# Patient Record
Sex: FEMALE | Race: WHITE | NOT HISPANIC OR LATINO | URBAN - METROPOLITAN AREA
[De-identification: names, ages, dates, MRNs, and addresses within clinical notes are randomized per-mention and may not be internally consistent; named-entity substitution may affect disease eponyms.]

---

## 2023-05-05 ENCOUNTER — EMERGENCY (EMERGENCY)
Facility: HOSPITAL | Age: 43
LOS: 1 days | Discharge: HOME | End: 2023-05-05
Attending: EMERGENCY MEDICINE | Admitting: EMERGENCY MEDICINE
Payer: COMMERCIAL

## 2023-05-05 VITALS
DIASTOLIC BLOOD PRESSURE: 87 MMHG | HEART RATE: 97 BPM | TEMPERATURE: 99 F | SYSTOLIC BLOOD PRESSURE: 152 MMHG | RESPIRATION RATE: 18 BRPM | OXYGEN SATURATION: 99 % | WEIGHT: 147.49 LBS

## 2023-05-05 VITALS
OXYGEN SATURATION: 99 % | TEMPERATURE: 98 F | RESPIRATION RATE: 16 BRPM | SYSTOLIC BLOOD PRESSURE: 125 MMHG | DIASTOLIC BLOOD PRESSURE: 78 MMHG | HEART RATE: 67 BPM

## 2023-05-05 DIAGNOSIS — R10.31 RIGHT LOWER QUADRANT PAIN: ICD-10-CM

## 2023-05-05 DIAGNOSIS — N84.0 POLYP OF CORPUS UTERI: ICD-10-CM

## 2023-05-05 DIAGNOSIS — R11.0 NAUSEA: ICD-10-CM

## 2023-05-05 DIAGNOSIS — R63.0 ANOREXIA: ICD-10-CM

## 2023-05-05 DIAGNOSIS — N93.9 ABNORMAL UTERINE AND VAGINAL BLEEDING, UNSPECIFIED: ICD-10-CM

## 2023-05-05 LAB
ALBUMIN SERPL ELPH-MCNC: 4.5 G/DL — SIGNIFICANT CHANGE UP (ref 3.3–5)
ALP SERPL-CCNC: 61 U/L — SIGNIFICANT CHANGE UP (ref 40–120)
ALT FLD-CCNC: 14 U/L — SIGNIFICANT CHANGE UP (ref 10–45)
ANION GAP SERPL CALC-SCNC: 11 MMOL/L — SIGNIFICANT CHANGE UP (ref 5–17)
APPEARANCE UR: ABNORMAL
AST SERPL-CCNC: 15 U/L — SIGNIFICANT CHANGE UP (ref 10–40)
BACTERIA # UR AUTO: PRESENT /HPF
BASOPHILS # BLD AUTO: 0.03 K/UL — SIGNIFICANT CHANGE UP (ref 0–0.2)
BASOPHILS NFR BLD AUTO: 0.3 % — SIGNIFICANT CHANGE UP (ref 0–2)
BILIRUB SERPL-MCNC: 0.4 MG/DL — SIGNIFICANT CHANGE UP (ref 0.2–1.2)
BILIRUB UR-MCNC: NEGATIVE — SIGNIFICANT CHANGE UP
BUN SERPL-MCNC: 13 MG/DL — SIGNIFICANT CHANGE UP (ref 7–23)
CALCIUM SERPL-MCNC: 9.6 MG/DL — SIGNIFICANT CHANGE UP (ref 8.4–10.5)
CHLORIDE SERPL-SCNC: 101 MMOL/L — SIGNIFICANT CHANGE UP (ref 96–108)
CO2 SERPL-SCNC: 23 MMOL/L — SIGNIFICANT CHANGE UP (ref 22–31)
COLOR SPEC: ABNORMAL
CREAT SERPL-MCNC: 0.68 MG/DL — SIGNIFICANT CHANGE UP (ref 0.5–1.3)
DIFF PNL FLD: ABNORMAL
EGFR: 111 ML/MIN/1.73M2 — SIGNIFICANT CHANGE UP
EOSINOPHIL # BLD AUTO: 0.01 K/UL — SIGNIFICANT CHANGE UP (ref 0–0.5)
EOSINOPHIL NFR BLD AUTO: 0.1 % — SIGNIFICANT CHANGE UP (ref 0–6)
EPI CELLS # UR: SIGNIFICANT CHANGE UP /HPF (ref 0–5)
GLUCOSE SERPL-MCNC: 105 MG/DL — HIGH (ref 70–99)
GLUCOSE UR QL: NEGATIVE — SIGNIFICANT CHANGE UP
HCT VFR BLD CALC: 41.5 % — SIGNIFICANT CHANGE UP (ref 34.5–45)
HGB BLD-MCNC: 13.7 G/DL — SIGNIFICANT CHANGE UP (ref 11.5–15.5)
IMM GRANULOCYTES NFR BLD AUTO: 0.3 % — SIGNIFICANT CHANGE UP (ref 0–0.9)
KETONES UR-MCNC: 15 MG/DL
LEUKOCYTE ESTERASE UR-ACNC: ABNORMAL
LIDOCAIN IGE QN: 19 U/L — SIGNIFICANT CHANGE UP (ref 7–60)
LYMPHOCYTES # BLD AUTO: 1.51 K/UL — SIGNIFICANT CHANGE UP (ref 1–3.3)
LYMPHOCYTES # BLD AUTO: 15.4 % — SIGNIFICANT CHANGE UP (ref 13–44)
MCHC RBC-ENTMCNC: 29.3 PG — SIGNIFICANT CHANGE UP (ref 27–34)
MCHC RBC-ENTMCNC: 33 GM/DL — SIGNIFICANT CHANGE UP (ref 32–36)
MCV RBC AUTO: 88.9 FL — SIGNIFICANT CHANGE UP (ref 80–100)
MONOCYTES # BLD AUTO: 0.35 K/UL — SIGNIFICANT CHANGE UP (ref 0–0.9)
MONOCYTES NFR BLD AUTO: 3.6 % — SIGNIFICANT CHANGE UP (ref 2–14)
NEUTROPHILS # BLD AUTO: 7.85 K/UL — HIGH (ref 1.8–7.4)
NEUTROPHILS NFR BLD AUTO: 80.3 % — HIGH (ref 43–77)
NITRITE UR-MCNC: POSITIVE
NRBC # BLD: 0 /100 WBCS — SIGNIFICANT CHANGE UP (ref 0–0)
PH UR: 5 — SIGNIFICANT CHANGE UP (ref 5–8)
PLATELET # BLD AUTO: 322 K/UL — SIGNIFICANT CHANGE UP (ref 150–400)
POTASSIUM SERPL-MCNC: 3.6 MMOL/L — SIGNIFICANT CHANGE UP (ref 3.5–5.3)
POTASSIUM SERPL-SCNC: 3.6 MMOL/L — SIGNIFICANT CHANGE UP (ref 3.5–5.3)
PROT SERPL-MCNC: 7.8 G/DL — SIGNIFICANT CHANGE UP (ref 6–8.3)
PROT UR-MCNC: 100 MG/DL
RBC # BLD: 4.67 M/UL — SIGNIFICANT CHANGE UP (ref 3.8–5.2)
RBC # FLD: 13.1 % — SIGNIFICANT CHANGE UP (ref 10.3–14.5)
RBC CASTS # UR COMP ASSIST: ABNORMAL /HPF
SODIUM SERPL-SCNC: 135 MMOL/L — SIGNIFICANT CHANGE UP (ref 135–145)
SP GR SPEC: >=1.03 — SIGNIFICANT CHANGE UP (ref 1–1.03)
UROBILINOGEN FLD QL: 1 E.U./DL — SIGNIFICANT CHANGE UP
WBC # BLD: 9.78 K/UL — SIGNIFICANT CHANGE UP (ref 3.8–10.5)
WBC # FLD AUTO: 9.78 K/UL — SIGNIFICANT CHANGE UP (ref 3.8–10.5)
WBC UR QL: ABNORMAL /HPF

## 2023-05-05 PROCEDURE — 85025 COMPLETE CBC W/AUTO DIFF WBC: CPT

## 2023-05-05 PROCEDURE — 36415 COLL VENOUS BLD VENIPUNCTURE: CPT

## 2023-05-05 PROCEDURE — 76830 TRANSVAGINAL US NON-OB: CPT | Mod: 26

## 2023-05-05 PROCEDURE — 82962 GLUCOSE BLOOD TEST: CPT

## 2023-05-05 PROCEDURE — 83690 ASSAY OF LIPASE: CPT

## 2023-05-05 PROCEDURE — 74177 CT ABD & PELVIS W/CONTRAST: CPT | Mod: 26,MA

## 2023-05-05 PROCEDURE — 76856 US EXAM PELVIC COMPLETE: CPT

## 2023-05-05 PROCEDURE — 74177 CT ABD & PELVIS W/CONTRAST: CPT | Mod: MA

## 2023-05-05 PROCEDURE — 99284 EMERGENCY DEPT VISIT MOD MDM: CPT

## 2023-05-05 PROCEDURE — 96374 THER/PROPH/DIAG INJ IV PUSH: CPT | Mod: XU

## 2023-05-05 PROCEDURE — 99284 EMERGENCY DEPT VISIT MOD MDM: CPT | Mod: 25

## 2023-05-05 PROCEDURE — 80053 COMPREHEN METABOLIC PANEL: CPT

## 2023-05-05 PROCEDURE — 76830 TRANSVAGINAL US NON-OB: CPT

## 2023-05-05 PROCEDURE — 76856 US EXAM PELVIC COMPLETE: CPT | Mod: 26

## 2023-05-05 PROCEDURE — 81001 URINALYSIS AUTO W/SCOPE: CPT

## 2023-05-05 PROCEDURE — 87086 URINE CULTURE/COLONY COUNT: CPT

## 2023-05-05 RX ORDER — ONDANSETRON 8 MG/1
4 TABLET, FILM COATED ORAL ONCE
Refills: 0 | Status: DISCONTINUED | OUTPATIENT
Start: 2023-05-05 | End: 2023-05-09

## 2023-05-05 RX ORDER — KETOROLAC TROMETHAMINE 30 MG/ML
15 SYRINGE (ML) INJECTION ONCE
Refills: 0 | Status: DISCONTINUED | OUTPATIENT
Start: 2023-05-05 | End: 2023-05-05

## 2023-05-05 RX ORDER — IOHEXOL 300 MG/ML
30 INJECTION, SOLUTION INTRAVENOUS ONCE
Refills: 0 | Status: COMPLETED | OUTPATIENT
Start: 2023-05-05 | End: 2023-05-05

## 2023-05-05 RX ADMIN — IOHEXOL 30 MILLILITER(S): 300 INJECTION, SOLUTION INTRAVENOUS at 17:07

## 2023-05-05 RX ADMIN — Medication 15 MILLIGRAM(S): at 18:07

## 2023-05-05 RX ADMIN — Medication 15 MILLIGRAM(S): at 17:07

## 2023-05-05 NOTE — ED PROVIDER NOTE - PROGRESS NOTE DETAILS
Results d/w pt. Advised on close f/u gyn. Strict return precautions. Pt and mother demonstrate understanding of plan.

## 2023-05-05 NOTE — ED PROVIDER NOTE - CLINICAL SUMMARY MEDICAL DECISION MAKING FREE TEXT BOX
Pt p/w RLQ / R pelvic pain and abnormal vaginal bleeding. Not sexually active. UCG neg. DDx includes but not limited to abnormal menses NOS, fibroids, endometriosis, ovarian cyst, less likely appendicitis, other pathology. Check labs, TVUS. Re-eval. Consider CT a/p if US unrevealing. Dispo pending w/u and clinical status

## 2023-05-05 NOTE — ED PROVIDER NOTE - PHYSICAL EXAMINATION
Constitutional: Well appearing, awake, alert, oriented to person, place, time/situation and in no apparent distress.  ENMT: Airway patent. Normal MM  Eyes: Clear bilaterally. no conjunctival pallor  Cardiac: Normal rate, regular rhythm.  Heart sounds S1, S2.  No murmurs, rubs or gallops.  Respiratory: Breaths sounds equal and clear b/l. No increased WOB, tachypnea, hypoxia, or accessory mm use. Pt speaks in full sentences.   Gastrointestinal: Abd soft, ND, NABS. + mild ttp in the RLQ / R pelvic region. No guarding, rebound, or rigidity. No pulsatile abdominal masses. No organomegaly appreciated. No CVAT   : normal external genitalia. + 2-3mL blood pooling in the vagina. no clots. No active bleeding from the cervix nor with coughing. + mild R adnexal ttp. no masses appreciated. cervix closed. no L adnexal ttp. no CMT  Musculoskeletal: Range of motion is not limited  Neuro: Alert and oriented x 3, face symmetric and speech fluent. Strength 5/5 x 4 ext and symmetric, nml gross motor movement, nml gait. No focal deficits noted.  Skin: Skin normal color for race, warm, dry and intact. No evidence of rash.  Psych: Alert and oriented to person, place, time/situation. normal mood and affect. no apparent risk to self or others.

## 2023-05-05 NOTE — ED PROVIDER NOTE - NSFOLLOWUPINSTRUCTIONS_ED_ALL_ED_FT
Your blood work was within normal limits. Your urinalysis is equivocal for infection. It is likely contaminated from the vaginal bleeding. You reported no symptoms. A urine culture was sent today and will result in 2-3 days. If significant bacteria grows in the culture that requires treatment, you will be contacted.     You had a pelvic ultrasound which showed a possible endometrial polyp, which may cause increased bleeding. It is important you see a gynecologist for further evaluation and treatment. There were no other significant abnormalities on your ultrasound.     [facility]  [address] [place], [place]  [phone]     You had a CT of the abdomen and pelvis which did not show any acute abnormalities. You have a normal appendix.    Return to the ED for heavy vaginal bleeding (more than 1 pad per hour), severe pain, fever, persistent vomiting, or other concerning symptoms.     Abnormal Uterine Bleeding  A female body showing the reproductive organs, with a close-up view of the uterus and vagina.  Abnormal uterine bleeding is unusual bleeding from the uterus. It includes bleeding after sex, or bleeding or spotting between menstrual periods. It may also include bleeding that is heavier than normal, menstrual periods that last longer than usual, or bleeding that occurs after menopause.    Abnormal uterine bleeding can affect teenagers, women in their reproductive years, pregnant women, and women who have reached menopause. Common causes of abnormal uterine bleeding include:  Pregnancy.  Abnormal growths within the lining of the uterus (polyps).  Benign tumors or growths in the uterus (fibroids). These are not cancer.  Infection.  Cancer.  Too much or too little of some hormones in the body (hormonal imbalances).  Any type of abnormal bleeding should be checked by a health care provider. Many cases are minor and simple to treat, but others may be more serious. Treatment will depend on the cause of the bleeding and how severe it is.    Follow these instructions at home:  Medicines    Take over-the-counter and prescription medicines only as told by your health care provider.  Ask your health care provider about:  Taking medicines such as aspirin and ibuprofen. These medicines can thin your blood. Do not take these medicines unless your health care provider tells you to take them.  Taking over-the-counter medicines, vitamins, herbs, and supplements.  If you were prescribed iron pills, take them as told by your health care provider. Iron pills help to replace iron that your body loses because of this condition.  Managing constipation    In cases of severe bleeding, you may be asked to increase your iron intake to treat anemia. Doing this may cause constipation. To prevent or treat constipation, you may need to:  Drink enough fluid to keep your urine pale yellow.  Take over-the-counter or prescription medicines.  Eat foods that are high in fiber, such as beans, whole grains, and fresh fruits and vegetables.  Limit foods that are high in fat and processed sugars, such as fried or sweet foods.  Activity    Alter your activity to decrease bleeding if you need to change your sanitary pad more than one time every 2 hours:  Lie in bed with your feet raised (elevated).  Place a cold pack on your lower abdomen.  Rest as much as possible until the bleeding stops or slows down.  General instructions    Do not use tampons, douche, or have sex until your health care provider says these things are okay.  Change your sanitary pads often.  Get regular exams. These include pelvic exams and cervical cancer screenings.  It is up to you to get the results of any tests that are done. Ask your health care provider, or the department that is doing the tests, when your results will be ready.  Monitor your condition for any changes. For 2 months, write down:  When your menstrual period starts.  When your menstrual period ends.  When any abnormal vaginal bleeding occurs.  What problems you notice.  Keep all follow-up visits. This is important.  Contact a health care provider if:  You have bleeding that lasts for more than one week.  You feel dizzy at times.  You feel nauseous or you vomit.  You feel light-headed or weak.  You notice any other changes that show that your condition is getting worse.  Get help right away if:  You faint.  You have bleeding that soaks through a sanitary pad every hour.  You have pain in the abdomen.  You have a fever or chills.  You become sweaty or weak.  You pass large blood clots from your vagina.  These symptoms may represent a serious problem that is an emergency. Do not wait to see if the symptoms will go away. Get medical help right away. Call your local emergency services (911 in the [place] Do not drive yourself to the hospital.    Summary  Abnormal uterine bleeding is unusual bleeding from the uterus.  Any type of abnormal bleeding should be checked by a health care provider. Many cases are minor and simple to treat, but others may be more serious.  Treatment will depend on the cause of the bleeding and how severe it is.  Get help right away if you faint, you have bleeding that soaks through a sanitary pad every hour, or you pass large blood clots from your vagina.  This information is not intended to replace advice given to you by your health care provider. Make sure you discuss any questions you have with your health care provider.    Endometrial Polyps    WHAT YOU NEED TO KNOW:    What is an endometrial polyp? A polyp is a mass of tissue that grows in the lining of your uterus (called the endometrium). A polyp is connected to the lining by a stalk. A polyp may be cancer, but most polyps are benign (not cancer). The size can range from very small to about the size of a golf ball. A large polyp may push down through the cervix and into your vagina. You may also have more than one polyp.  Female Reproductive System    What increases my risk for an endometrial polyp?    High estrogen (female hormone) levels    Obesity    Age 50 years or older    Use of certain breast cancer medicines  What are the signs and symptoms of an endometrial polyp? You may have no signs or symptoms. The polyp may be found during tests or treatment for another condition. You may have any of the following if you do have signs or symptoms:    Irregular bleeding during childbearing years, or trouble getting pregnant    Heavy bleeding during your period    Spotting, especially after sex    Vaginal bleeding after menopause  How is an endometrial polyp diagnosed? Your healthcare provider will examine you and ask about your symptoms. Certain tests may be used to check for a polyp. During a sonohysterogram (water ultrasound), water is pushed into your uterus to keep it expanded. This helps the ultrasound see polyps more easily. A hysteroscopy is a test that uses a scope to see inside your uterus. Tissue samples may also be taken and sent to a lab to be tested for cancer.    How is an endometrial polyp treated? An endometrial polyp may go away on its own without treatment. If it does not go away, causes discomfort, or gets larger, it may need to be removed. A procedure called a dilation and curettage (D&C) is usually used to remove endometrial polyps. During a D&C, your healthcare provider will remove tissue from your uterus. This will remove the polyp along with the other tissue. You may need more than 1 D&C if the polyp is not removed the first time. A hysterectomy may be used to remove your uterus if you have several polyps that are cancer.    What can I do to manage an endometrial polyp?    Reach or maintain a healthy weight. Extra weight increases your estrogen level. This can increase your risk for more polyps. Talk to your healthcare provider about a healthy weight for you. He or she can help you create a weight loss plan if you need to lose weight.    Eat a variety of healthy foods. Healthy foods can help you manage your weight and lower your risk for cancer. Healthy foods include fruits, vegetables, low-fat dairy products, cooked beans, lean meats, and fish. Your healthcare provider or a dietitian can help you create a healthy meal plan.  Healthy Foods      Talk to your healthcare provider about pregnancy. You may have problems getting pregnant if the polyp affects other parts of your reproductive system. Your healthcare provider can talk to you about treatment that may help.  When should I seek immediate care?    You have bleeding from your vagina that continues or is bright red.    You feel dizzy or lightheaded from heavy blood loss.  When should I contact my healthcare provider?    You have new or worsening symptoms.    You have symptoms again after successful treatment for a polyp.    You have questions or concerns about your condition or care.  CARE AGREEMENT:    You have the right to help plan your care. Learn about your health condition and how it may be treated. Discuss treatment options with your healthcare providers to decide what care you want to receive. You always have the right to refuse treatment.    Abdominal Pain    Many things can cause abdominal pain. Many times, abdominal pain is not caused by a disease and will improve without treatment. Your health care provider will do a physical exam to determine if there is a dangerous cause of your pain; blood tests and imaging may help determine the cause of your pain. However, in many cases, no cause may be found and you may need further testing as an outpatient. Monitor your abdominal pain for any changes.     SEEK IMMEDIATE MEDICAL CARE IF YOU HAVE ANY OF THE FOLLOWING SYMPTOMS: worsening abdominal pain, uncontrollable vomiting, profuse diarrhea, inability to have bowel movements or pass gas, black or bloody stools, fever accompanying chest pain or back pain, or fainting. These symptoms may represent a serious problem that is an emergency. Do not wait to see if the symptoms will go away. Get medical help right away. Call 911 and do not drive yourself to the hospital.

## 2023-05-05 NOTE — ED PROVIDER NOTE - CARE PLAN
1 Principal Discharge DX:	Abnormal vaginal bleeding  Secondary Diagnosis:	Endometrial polyp  Secondary Diagnosis:	Abdominal pain

## 2023-05-05 NOTE — ED ADULT NURSE NOTE - OBJECTIVE STATEMENT
patient arrived to ED c/o right sided lower abdominal pain, which started a few days ago, and vaginal bleeding since yesterday. patient endorses going through 5 pads. denies fever, cough, shortness of breath, nausea or vomiting.

## 2023-05-05 NOTE — ED ADULT TRIAGE NOTE - CHIEF COMPLAINT QUOTE
"  I have pain to the right lower abdomen going to my back for 5 days and vaginal bleeding since yesterday"

## 2023-05-05 NOTE — ED PROVIDER NOTE - OBJECTIVE STATEMENT
Pt w/ no sig PMHx, no PSHx p/w abdominal pain and vaginal bleeding. The pain is in the RLQ / R pelvic region for the past 5 days. The pain is constant but waxes and wanes in severity, w/o clear alleviating or mitigating factors. She has not taken anything for pain. + nausea. No vomiting. + dec appetite. No diarrhea or constipation. She states she last had a normal period in the end of 2023. Prior to that she had regular periods. Since then she has had intermittent brownish spotting. Onset yesterday, bleeding increased w/ red blood, more than a period. She has used 5 pads today. No known hx ovarian cyst nor fibroids. She has not seen a gyn or a PCP in years. She is not sexually active and has not been in some time. . No hx STDs. no other abnormal discharge. No F/U/D nor hematuria. No f/c. Never had EGD nor cscope

## 2023-05-05 NOTE — ED PROVIDER NOTE - PATIENT PORTAL LINK FT
You can access the FollowMyHealth Patient Portal offered by [facility] by registering at the following website: [email or web address]. By joining [employer]’s FollowMyHealth portal, you will also be able to view your health information using other applications (apps) compatible with our system.

## 2023-05-06 LAB
CULTURE RESULTS: SIGNIFICANT CHANGE UP
SPECIMEN SOURCE: SIGNIFICANT CHANGE UP

## 2024-07-09 ENCOUNTER — EMERGENCY (EMERGENCY)
Facility: HOSPITAL | Age: 44
LOS: 1 days | Discharge: HOME | End: 2024-07-09
Attending: EMERGENCY MEDICINE | Admitting: EMERGENCY MEDICINE
Payer: COMMERCIAL

## 2024-07-09 VITALS
WEIGHT: 134.92 LBS | HEIGHT: 65 IN | HEART RATE: 90 BPM | SYSTOLIC BLOOD PRESSURE: 146 MMHG | OXYGEN SATURATION: 100 % | DIASTOLIC BLOOD PRESSURE: 97 MMHG | TEMPERATURE: 99 F | RESPIRATION RATE: 16 BRPM

## 2024-07-09 DIAGNOSIS — M54.9 DORSALGIA, UNSPECIFIED: ICD-10-CM

## 2024-07-09 DIAGNOSIS — L50.9 URTICARIA, UNSPECIFIED: ICD-10-CM

## 2024-07-09 DIAGNOSIS — R74.01 ELEVATION OF LEVELS OF LIVER TRANSAMINASE LEVELS: ICD-10-CM

## 2024-07-09 DIAGNOSIS — R19.7 DIARRHEA, UNSPECIFIED: ICD-10-CM

## 2024-07-09 DIAGNOSIS — R10.11 RIGHT UPPER QUADRANT PAIN: ICD-10-CM

## 2024-07-09 DIAGNOSIS — Z20.822 CONTACT WITH AND (SUSPECTED) EXPOSURE TO COVID-19: ICD-10-CM

## 2024-07-09 DIAGNOSIS — R68.2 DRY MOUTH, UNSPECIFIED: ICD-10-CM

## 2024-07-09 LAB
ADD ON TEST-SPECIMEN IN LAB: SIGNIFICANT CHANGE UP
ALBUMIN SERPL ELPH-MCNC: 4.1 G/DL — SIGNIFICANT CHANGE UP (ref 3.3–5)
ALP SERPL-CCNC: 96 U/L — SIGNIFICANT CHANGE UP (ref 40–120)
ALT FLD-CCNC: 183 U/L — HIGH (ref 10–45)
ANION GAP SERPL CALC-SCNC: 11 MMOL/L — SIGNIFICANT CHANGE UP (ref 5–17)
AST SERPL-CCNC: 85 U/L — HIGH (ref 10–40)
BASOPHILS # BLD AUTO: 0 K/UL — SIGNIFICANT CHANGE UP (ref 0–0.2)
BASOPHILS NFR BLD AUTO: 0 % — SIGNIFICANT CHANGE UP (ref 0–2)
BILIRUB SERPL-MCNC: 0.3 MG/DL — SIGNIFICANT CHANGE UP (ref 0.2–1.2)
BUN SERPL-MCNC: 8 MG/DL — SIGNIFICANT CHANGE UP (ref 7–23)
CALCIUM SERPL-MCNC: 9.4 MG/DL — SIGNIFICANT CHANGE UP (ref 8.4–10.5)
CHLORIDE SERPL-SCNC: 104 MMOL/L — SIGNIFICANT CHANGE UP (ref 96–108)
CO2 SERPL-SCNC: 24 MMOL/L — SIGNIFICANT CHANGE UP (ref 22–31)
CREAT SERPL-MCNC: 0.66 MG/DL — SIGNIFICANT CHANGE UP (ref 0.5–1.3)
EGFR: 111 ML/MIN/1.73M2 — SIGNIFICANT CHANGE UP
EOSINOPHIL # BLD AUTO: 0.12 K/UL — SIGNIFICANT CHANGE UP (ref 0–0.5)
EOSINOPHIL NFR BLD AUTO: 1.7 % — SIGNIFICANT CHANGE UP (ref 0–6)
FLUAV AG NPH QL: SIGNIFICANT CHANGE UP
FLUBV AG NPH QL: SIGNIFICANT CHANGE UP
GIANT PLATELETS BLD QL SMEAR: PRESENT — SIGNIFICANT CHANGE UP
GLUCOSE SERPL-MCNC: 111 MG/DL — HIGH (ref 70–99)
HCT VFR BLD CALC: 42 % — SIGNIFICANT CHANGE UP (ref 34.5–45)
HGB BLD-MCNC: 13.7 G/DL — SIGNIFICANT CHANGE UP (ref 11.5–15.5)
LIDOCAIN IGE QN: 17 U/L — SIGNIFICANT CHANGE UP (ref 7–60)
LYMPHOCYTES # BLD AUTO: 1.42 K/UL — SIGNIFICANT CHANGE UP (ref 1–3.3)
LYMPHOCYTES # BLD AUTO: 20.2 % — SIGNIFICANT CHANGE UP (ref 13–44)
MAGNESIUM SERPL-MCNC: 1.8 MG/DL — SIGNIFICANT CHANGE UP (ref 1.6–2.6)
MANUAL SMEAR VERIFICATION: SIGNIFICANT CHANGE UP
MCHC RBC-ENTMCNC: 29 PG — SIGNIFICANT CHANGE UP (ref 27–34)
MCHC RBC-ENTMCNC: 32.6 GM/DL — SIGNIFICANT CHANGE UP (ref 32–36)
MCV RBC AUTO: 89 FL — SIGNIFICANT CHANGE UP (ref 80–100)
MONOCYTES # BLD AUTO: 0.31 K/UL — SIGNIFICANT CHANGE UP (ref 0–0.9)
MONOCYTES NFR BLD AUTO: 4.4 % — SIGNIFICANT CHANGE UP (ref 2–14)
NEUTROPHILS # BLD AUTO: 5.2 K/UL — SIGNIFICANT CHANGE UP (ref 1.8–7.4)
NEUTROPHILS NFR BLD AUTO: 73.7 % — SIGNIFICANT CHANGE UP (ref 43–77)
NRBC # BLD: 1 /100 WBCS — HIGH (ref 0–0)
NRBC # BLD: SIGNIFICANT CHANGE UP /100 WBCS (ref 0–0)
PLAT MORPH BLD: ABNORMAL
PLATELET # BLD AUTO: 255 K/UL — SIGNIFICANT CHANGE UP (ref 150–400)
POLYCHROMASIA BLD QL SMEAR: SLIGHT — SIGNIFICANT CHANGE UP
POTASSIUM SERPL-MCNC: 4 MMOL/L — SIGNIFICANT CHANGE UP (ref 3.5–5.3)
POTASSIUM SERPL-SCNC: 4 MMOL/L — SIGNIFICANT CHANGE UP (ref 3.5–5.3)
PROT SERPL-MCNC: 7.7 G/DL — SIGNIFICANT CHANGE UP (ref 6–8.3)
RBC # BLD: 4.72 M/UL — SIGNIFICANT CHANGE UP (ref 3.8–5.2)
RBC # FLD: 12.6 % — SIGNIFICANT CHANGE UP (ref 10.3–14.5)
RBC BLD AUTO: NORMAL — SIGNIFICANT CHANGE UP
RSV RNA NPH QL NAA+NON-PROBE: SIGNIFICANT CHANGE UP
SARS-COV-2 RNA SPEC QL NAA+PROBE: SIGNIFICANT CHANGE UP
SMUDGE CELLS # BLD: PRESENT — SIGNIFICANT CHANGE UP
SODIUM SERPL-SCNC: 139 MMOL/L — SIGNIFICANT CHANGE UP (ref 135–145)
WBC # BLD: 7.05 K/UL — SIGNIFICANT CHANGE UP (ref 3.8–10.5)
WBC # FLD AUTO: 7.05 K/UL — SIGNIFICANT CHANGE UP (ref 3.8–10.5)

## 2024-07-09 PROCEDURE — 76705 ECHO EXAM OF ABDOMEN: CPT

## 2024-07-09 PROCEDURE — 85025 COMPLETE CBC W/AUTO DIFF WBC: CPT

## 2024-07-09 PROCEDURE — 84484 ASSAY OF TROPONIN QUANT: CPT

## 2024-07-09 PROCEDURE — 71046 X-RAY EXAM CHEST 2 VIEWS: CPT

## 2024-07-09 PROCEDURE — 93010 ELECTROCARDIOGRAM REPORT: CPT

## 2024-07-09 PROCEDURE — 96374 THER/PROPH/DIAG INJ IV PUSH: CPT

## 2024-07-09 PROCEDURE — 87637 SARSCOV2&INF A&B&RSV AMP PRB: CPT

## 2024-07-09 PROCEDURE — 71046 X-RAY EXAM CHEST 2 VIEWS: CPT | Mod: 26

## 2024-07-09 PROCEDURE — 36415 COLL VENOUS BLD VENIPUNCTURE: CPT

## 2024-07-09 PROCEDURE — 76705 ECHO EXAM OF ABDOMEN: CPT | Mod: 26

## 2024-07-09 PROCEDURE — 93005 ELECTROCARDIOGRAM TRACING: CPT

## 2024-07-09 PROCEDURE — 83690 ASSAY OF LIPASE: CPT

## 2024-07-09 PROCEDURE — 96375 TX/PRO/DX INJ NEW DRUG ADDON: CPT

## 2024-07-09 PROCEDURE — 83735 ASSAY OF MAGNESIUM: CPT

## 2024-07-09 PROCEDURE — 80053 COMPREHEN METABOLIC PANEL: CPT

## 2024-07-09 PROCEDURE — 99285 EMERGENCY DEPT VISIT HI MDM: CPT | Mod: 25

## 2024-07-09 PROCEDURE — 99285 EMERGENCY DEPT VISIT HI MDM: CPT

## 2024-07-09 RX ORDER — DIPHENHYDRAMINE HCL 12.5MG/5ML
50 ELIXIR ORAL ONCE
Refills: 0 | Status: COMPLETED | OUTPATIENT
Start: 2024-07-09 | End: 2024-07-09

## 2024-07-09 RX ORDER — SODIUM CHLORIDE 0.9 % (FLUSH) 0.9 %
1000 SYRINGE (ML) INJECTION ONCE
Refills: 0 | Status: COMPLETED | OUTPATIENT
Start: 2024-07-09 | End: 2024-07-09

## 2024-07-09 RX ORDER — PREDNISONE 10 MG/1
2 TABLET ORAL
Qty: 8 | Refills: 0
Start: 2024-07-09 | End: 2024-07-12

## 2024-07-09 RX ORDER — FAMOTIDINE 40 MG
20 TABLET ORAL ONCE
Refills: 0 | Status: COMPLETED | OUTPATIENT
Start: 2024-07-09 | End: 2024-07-09

## 2024-07-09 RX ORDER — DIPHENHYDRAMINE HCL 12.5MG/5ML
1 ELIXIR ORAL
Qty: 30 | Refills: 0
Start: 2024-07-09

## 2024-07-09 RX ORDER — METHYLPREDNISOLONE ACETATE 20 MG/ML
40 VIAL (ML) INJECTION ONCE
Refills: 0 | Status: COMPLETED | OUTPATIENT
Start: 2024-07-09 | End: 2024-07-09

## 2024-07-09 RX ADMIN — Medication 50 MILLIGRAM(S): at 13:43

## 2024-07-09 RX ADMIN — Medication 20 MILLIGRAM(S): at 13:43

## 2024-07-09 RX ADMIN — Medication 40 MILLIGRAM(S): at 13:45

## 2024-07-09 RX ADMIN — Medication 1000 MILLILITER(S): at 13:42
